# Patient Record
(demographics unavailable — no encounter records)

---

## 2024-12-05 NOTE — DISCUSSION/SUMMARY
[de-identified] :  Patient is a 33 year old female with thoracic spine pain. Patient's thoracic muscles are tight. Informed patient this is likely a muscular issue. Discussed continuing Robaxin during evening and Medrol Dose Pack during the day. Once Medrol Dose pack is complete, ibuprofen/Aleve as needed. Prescription for Robaxin and Medrol Dose pack provided. Patient was provided with a referral for thoracic spine Physical Therapy to work on stretching, strengthening and range of motion. Patient has not had relief from icing, discussed heating.  Follow Up 2 weeks  Prior to appointment and during encounter with patient extensive medical records were reviewed including but not limited to, hospital records, outpatient records, imaging results, and lab data. During this appointment the patient was examined, diagnoses were discussed and explained in a face to face manner. In addition extensive time was spent reviewing aforementioned diagnostic studies. Counseling including abnormal image results, differential diagnoses, treatment options, risk and benefits, lifestyle changes, current condition, and current medications was performed. Patient's comments, questions, and concerns were addressed and patient verbalized understanding. Based on this patient's presentation at our office, which is an orthopedic spine surgeon's office, this patient inherently / intrinsically has a risk, however minute, of developing issues such as Cauda equina syndrome, bowel and bladder changes, or progression of motor or neurological deficits such as paralysis which may be permanent.  SALENA AMAYA am acting as a Scribe for Dr. Shadi MARTINO, Stephani Amaya, attest that this documentation has been prepared under the direction and in the presence of Provider Alessandro Hsu MD.

## 2024-12-05 NOTE — DATA REVIEWED
[FreeTextEntry1] : OC XR 12/04/24 of B/L Thoracic spine  I stop paperwork reviewed Hospital discharge notes reviewed

## 2024-12-05 NOTE — DISCUSSION/SUMMARY
[de-identified] :  Patient is a 33 year old female with thoracic spine pain. Patient's thoracic muscles are tight. Informed patient this is likely a muscular issue. Discussed continuing Robaxin during evening and Medrol Dose Pack during the day. Once Medrol Dose pack is complete, ibuprofen/Aleve as needed. Prescription for Robaxin and Medrol Dose pack provided. Patient was provided with a referral for thoracic spine Physical Therapy to work on stretching, strengthening and range of motion. Patient has not had relief from icing, discussed heating.  Follow Up 2 weeks  Prior to appointment and during encounter with patient extensive medical records were reviewed including but not limited to, hospital records, outpatient records, imaging results, and lab data. During this appointment the patient was examined, diagnoses were discussed and explained in a face to face manner. In addition extensive time was spent reviewing aforementioned diagnostic studies. Counseling including abnormal image results, differential diagnoses, treatment options, risk and benefits, lifestyle changes, current condition, and current medications was performed. Patient's comments, questions, and concerns were addressed and patient verbalized understanding. Based on this patient's presentation at our office, which is an orthopedic spine surgeon's office, this patient inherently / intrinsically has a risk, however minute, of developing issues such as Cauda equina syndrome, bowel and bladder changes, or progression of motor or neurological deficits such as paralysis which may be permanent.  SALENA AMAYA am acting as a Scribe for Dr. Shadi MARTINO, Stephani Amaya, attest that this documentation has been prepared under the direction and in the presence of Provider Alessandro Hsu MD.

## 2024-12-05 NOTE — PHYSICAL EXAM
[Normal Coordination] : normal coordination [Normal DTR UE/LE] : normal DTR UE/LE  [Normal Sensation] : normal sensation [Normal Mood and Affect] : normal mood and affect [Oriented] : oriented [Able to Communicate] : able to communicate [Normal Skin] : normal skin [No Rash] : no rash [No Ulcers] : no ulcers [No Lesions] : no lesions [No obvious lymphadenopathy in areas examined] : no obvious lymphadenopathy in areas examined [Well Developed] : well developed [Peripheral vascular exam is grossly normal] : peripheral vascular exam is grossly normal [No Respiratory Distress] : no respiratory distress [] : full ROM with pain